# Patient Record
Sex: FEMALE | Race: WHITE | NOT HISPANIC OR LATINO
[De-identification: names, ages, dates, MRNs, and addresses within clinical notes are randomized per-mention and may not be internally consistent; named-entity substitution may affect disease eponyms.]

---

## 2019-11-08 ENCOUNTER — APPOINTMENT (OUTPATIENT)
Dept: VASCULAR SURGERY | Facility: CLINIC | Age: 59
End: 2019-11-08
Payer: COMMERCIAL

## 2019-11-08 PROCEDURE — 99204 OFFICE O/P NEW MOD 45 MIN: CPT

## 2019-12-19 ENCOUNTER — APPOINTMENT (OUTPATIENT)
Dept: VASCULAR SURGERY | Facility: HOSPITAL | Age: 59
End: 2019-12-19
Payer: COMMERCIAL

## 2019-12-19 PROCEDURE — 37193 REM ENDOVAS VENA CAVA FILTER: CPT

## 2020-10-23 PROBLEM — Z86.59 HISTORY OF DEPRESSION: Status: RESOLVED | Noted: 2020-10-23 | Resolved: 2020-10-23

## 2020-10-23 PROBLEM — Z86.59 HISTORY OF ANXIETY: Status: RESOLVED | Noted: 2020-10-23 | Resolved: 2020-10-23

## 2020-10-23 PROBLEM — Z80.8 FAMILY HISTORY OF GLIOBLASTOMA: Status: ACTIVE | Noted: 2020-10-23

## 2020-10-26 ENCOUNTER — APPOINTMENT (OUTPATIENT)
Dept: RADIATION ONCOLOGY | Facility: CLINIC | Age: 60
End: 2020-10-26
Payer: COMMERCIAL

## 2020-10-26 ENCOUNTER — LABORATORY RESULT (OUTPATIENT)
Age: 60
End: 2020-10-26

## 2020-10-26 ENCOUNTER — APPOINTMENT (OUTPATIENT)
Dept: HEMATOLOGY ONCOLOGY | Facility: CLINIC | Age: 60
End: 2020-10-26
Payer: COMMERCIAL

## 2020-10-26 VITALS
SYSTOLIC BLOOD PRESSURE: 158 MMHG | HEART RATE: 72 BPM | RESPIRATION RATE: 16 BRPM | BODY MASS INDEX: 27.46 KG/M2 | HEIGHT: 63 IN | WEIGHT: 155 LBS | OXYGEN SATURATION: 98 % | TEMPERATURE: 97.4 F | DIASTOLIC BLOOD PRESSURE: 98 MMHG

## 2020-10-26 VITALS
HEIGHT: 63 IN | HEART RATE: 91 BPM | RESPIRATION RATE: 18 BRPM | SYSTOLIC BLOOD PRESSURE: 153 MMHG | DIASTOLIC BLOOD PRESSURE: 93 MMHG | TEMPERATURE: 98.4 F | WEIGHT: 159 LBS | BODY MASS INDEX: 28.17 KG/M2 | OXYGEN SATURATION: 98 %

## 2020-10-26 DIAGNOSIS — Z80.49 FAMILY HISTORY OF MALIGNANT NEOPLASM OF OTHER GENITAL ORGANS: ICD-10-CM

## 2020-10-26 DIAGNOSIS — B37.0 CANDIDAL STOMATITIS: ICD-10-CM

## 2020-10-26 DIAGNOSIS — G40.909 EPILEPSY, UNSPECIFIED, NOT INTRACTABLE, W/OUT STATUS EPILEPTICUS: ICD-10-CM

## 2020-10-26 DIAGNOSIS — Z80.3 FAMILY HISTORY OF MALIGNANT NEOPLASM OF BREAST: ICD-10-CM

## 2020-10-26 DIAGNOSIS — Z87.891 PERSONAL HISTORY OF NICOTINE DEPENDENCE: ICD-10-CM

## 2020-10-26 DIAGNOSIS — Z80.0 FAMILY HISTORY OF MALIGNANT NEOPLASM OF DIGESTIVE ORGANS: ICD-10-CM

## 2020-10-26 DIAGNOSIS — Z86.59 PERSONAL HISTORY OF OTHER MENTAL AND BEHAVIORAL DISORDERS: ICD-10-CM

## 2020-10-26 DIAGNOSIS — R74.8 ABNORMAL LEVELS OF OTHER SERUM ENZYMES: ICD-10-CM

## 2020-10-26 DIAGNOSIS — Z78.9 OTHER SPECIFIED HEALTH STATUS: ICD-10-CM

## 2020-10-26 DIAGNOSIS — Z00.00 ENCOUNTER FOR GENERAL ADULT MEDICAL EXAMINATION W/OUT ABNORMAL FINDINGS: ICD-10-CM

## 2020-10-26 DIAGNOSIS — C71.9 MALIGNANT NEOPLASM OF BRAIN, UNSPECIFIED: ICD-10-CM

## 2020-10-26 DIAGNOSIS — Z80.8 FAMILY HISTORY OF MALIGNANT NEOPLASM OF OTHER ORGANS OR SYSTEMS: ICD-10-CM

## 2020-10-26 DIAGNOSIS — I82.409 ACUTE EMBOLISM AND THROMBOSIS OF UNSPECIFIED DEEP VEINS OF UNSPECIFIED LOWER EXTREMITY: ICD-10-CM

## 2020-10-26 DIAGNOSIS — R53.83 OTHER FATIGUE: ICD-10-CM

## 2020-10-26 PROCEDURE — 99205 OFFICE O/P NEW HI 60 MIN: CPT

## 2020-10-26 PROCEDURE — 99072 ADDL SUPL MATRL&STAF TM PHE: CPT

## 2020-10-26 RX ORDER — ESCITALOPRAM OXALATE 20 MG/1
20 TABLET, FILM COATED ORAL
Refills: 0 | Status: ACTIVE | COMMUNITY

## 2020-10-26 RX ORDER — LEVETIRACETAM 1000 MG/1
TABLET, FILM COATED ORAL
Refills: 0 | Status: ACTIVE | COMMUNITY

## 2020-10-26 RX ORDER — BUPROPION HYDROCHLORIDE 300 MG/1
300 TABLET, EXTENDED RELEASE ORAL
Refills: 0 | Status: ACTIVE | COMMUNITY

## 2020-10-26 RX ORDER — LOSARTAN POTASSIUM 25 MG/1
25 TABLET, FILM COATED ORAL
Refills: 0 | Status: ACTIVE | COMMUNITY

## 2020-10-26 NOTE — HISTORY OF PRESENT ILLNESS
[de-identified] : THis is a 59 y/o woman presenting with recurrent GBM. \par Patient presented in 2018 with headache and behavior changes/forgetfulness. Scans confirmed the presence of a large mass in left frontal lobe.  Strangely the patients younger sister had a GBM and . \par Patient had genetic testing which was negative. \par Patient had complete resection in 2018. This revealed  high grade glioma, negative for MARIE and MGMT methylation not detected. \par She underwent radiation and chemotherapy with temodar but apparently had problems with severe thrombocytopenia, elevation in lft's and possibly pneumonitis. \par There for was dc , so she did not complete the STUPP protocol , and i am not sure how much of the temodar with radiation she actually completed. \par The radiation completed in oct 2018. \par Around this time she also had a PE and DVT, she wsa started on eliqis but this was dc due to hemoptysis and a filter was placed in IVC. This was removed eventually. \par It is also not clear to me how long she was on a/c for and whether follow up doppler has been done. But she declines any leg pain or swelling nor any sob or chestpain \par She also started optune. She also has been doing Katherine Moreno oil (high doses of marijuana)  Has had clear MRI's in follow up , last was in aug 2020 . Then in 2020 she was admitted with a seizure, according to spouse she had a clionic movement and speech problems. Was switched from dilantin to keppra. Has not had any seizures since then Her neurologist is dr Cline \par \par She currently follows with dr dennis fraser at [facility] in the [place] ([phone]) , call placed waiting for call back, according to last note, states will be  doing tumor board discussion \par \par Lately the patient has also been more forgetful, more lethargic and slower to respond. But no headaches \par \par MRI oct 2020 - shows progressive disease incluing a 1.9x1.5 cm lesion in left corona radiata, new 6mm in corpus collosum, new 9mm in left frontal lobe and right centrium seimiovale 3mm\par \par Patien saw dr stanley earlier today for radiation consult.  [de-identified] : Patient cont to deny any headache, no additonal seizure like activity \par + incontinance new \par No constipation nauea butdoes have very poor appeitte and poor po intake \par poor activity level

## 2020-10-26 NOTE — REASON FOR VISIT
[Initial Consultation] : an initial consultation [FreeTextEntry2] : GBM here for med onc evaluation

## 2020-10-26 NOTE — ASSESSMENT
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 \par \par 1) GBM \par now with progressive MRI and symptomatology \par agree with salvage RT \par call placed to neuro onc at [facility] dr dennis contreras \par discussed options for radiosensativity  and also to decrease edema or potiential for edema \par discussed restart temodar versus avastin \par reviewed avastin - rationale , efficacy and side effects including the risk of bleeding, dvt /PE , proteinuria , hypertension perforation and death \par i am especially concerned given the hx of dvt and pe in past (see below) \par check labs today - cmp and ua \par obtain old records from prior onc at [facility] \par await final recs form dr contreras \par dr stanley in planning phase of rt \par hopefully can start in one week \par restart dexamethasone for increase fatigue poor appetite and progresive brain disease \par \par 2) seizure \par follow up neuro \par cont keppra \par treatment of recurrence \par \par 3) DVT PE \par check repeat doppler \par repeat d dimer \par high risk of recurrent dvt or pe liliana with avastin \par if doppler positive full dose a/c \par if doopler negative consider low dose a/c for prophylaxis \par \par 4) thrush \par start nystatin \par \par 5) elevated lft's \par labs today \par \par 6) fatigue \par check work up (b12 iron and tsh) \par likely due to progressive disease \par \par 7) start protonix for prophylaxis \par advise PT and OT gave script \par will plan on nutrition consult also \par \par 8) incontinance \par check ua and uc \par dw patient and spouse at length \par time spent over one hour \par follow up in one week

## 2020-10-26 NOTE — REVIEW OF SYSTEMS
[Urinary Incontinence: Grade 1 - Occasional (e.g., with coughing, sneezing, etc.), pads not indicated] : Urinary Incontinence: Grade 1 - Occasional (e.g., with coughing, sneezing, etc.), pads not indicated [Dizziness: Grade 0] : Dizziness: Grade 0  [Lethargy: Grade 1 - Mild symptoms; reduced alertness and awareness] : Lethargy: Grade 1 - Mild symptoms; reduced alertness and awareness

## 2020-10-26 NOTE — PHYSICAL EXAM
[Normal] : affect appropriate [Ambulatory and capable of all self care but unable to carry out any work activities] : Status 2- Ambulatory and capable of all self care but unable to carry out any work activities. Up and about more than 50% of waking hours [Obese] : obese [de-identified] : thrush? white coating very dry  [de-identified] : seems intact interms of  CN, sensation and motor strenght , gait is normal  [de-identified] : flat affect

## 2020-10-26 NOTE — REVIEW OF SYSTEMS
[Fatigue] : fatigue [Negative] : Allergic/Immunologic [Fever] : no fever [Chills] : no chills [Night Sweats] : no night sweats [Incontinence] : incontinence

## 2020-10-27 RX ORDER — NYSTATIN 100000 [USP'U]/ML
100000 SUSPENSION ORAL
Qty: 300 | Refills: 0 | Status: ACTIVE | COMMUNITY
Start: 2020-10-26 | End: 1900-01-01

## 2020-10-27 RX ORDER — DEXAMETHASONE 2 MG/1
2 TABLET ORAL TWICE DAILY
Qty: 60 | Refills: 2 | Status: ACTIVE | COMMUNITY
Start: 2020-10-26 | End: 1900-01-01

## 2020-10-27 RX ORDER — RIVAROXABAN 10 MG/1
10 TABLET, FILM COATED ORAL
Qty: 90 | Refills: 3 | Status: ACTIVE | COMMUNITY
Start: 2020-10-27 | End: 1900-01-01

## 2020-10-27 RX ORDER — PANTOPRAZOLE 20 MG/1
20 TABLET, DELAYED RELEASE ORAL DAILY
Qty: 30 | Refills: 5 | Status: ACTIVE | COMMUNITY
Start: 2020-10-26 | End: 1900-01-01

## 2020-10-27 NOTE — DISEASE MANAGEMENT
[Pathological] : TNM Stage: p [N/A] : Currently not applicable [FreeTextEntry4] : Recurrent Glioblastoma [TTNM] : X [NTNM] : X [MTNM] : X

## 2020-10-27 NOTE — HISTORY OF PRESENT ILLNESS
[FreeTextEntry1] : Ms. Harrison is a 60 year old female who was last seen in our office on 11/6/2018 by Dr. Sylvester for Glioblastoma multiforme of the frontal lobe. Her previous treatment consisted of a gross total resection followed by radiotherapy delivered with concurrent Temodar chemotherapy. Total radiotherapy dose of 60 Gy completed on 10/02/2018. According to the patient and her family, she did not tolerate the temodar well and it was discontinued after completion of the radiation. \par \par Mrs. Harrison experienced  her first seizure in 9/2020 and was taken here to [facility], she was initially put on Dilantin and now is on Keppra and appears to be tolerating it well. She is followed by Dr. Briones- neurologist at [facility]. \par \par On 10/13/2020 she had an MRI of the brain performed at [facility] which revealed a new 19 mm x 15 mm ring-enhancing lesion in the left corona radiata. It also demonstrated an adjacent 5 mm nodule of enhancement in the left corona radiata. A  new 6 mm focus of enhancement in the posterior aspect of the body of the corpus callosum. A new 9mm focus of enhancement in the left frontal lobe. The 3 mm nodule of enhancement in the right centrum semiovale demonstrated on study 8/19/2020 had minimally increased in size. Again demonstrated was right frontoparietal craniotomy. Again demonstrated was the hemosiderin at site of right frontal tumor resection. There was persistent enhancement consistent with expected postoperative enhancement in the right frontal lobe around cystic area at site of resection. Right-sided thin, uniform, meningeal enhancement over the right convexity consistent with postoperative enhancement was not significantly changed.\par  There was an increase in cortical/subcortical abnormal signal in the left frontal lobe and the left corona radiata in the region of new enhancing lesions described above. That finding was suspicious for infiltrating tumor. There was no malignant-type lytic or blastic bone lesion.\par \par Per the patient's son, Mrs. Harrison has recently had a clinical decline with a worsening flat frontal affect, forgetfulness, and episodes of urinary incontinence. Decreased fluid intake and appetite. There is a notable increase in lethargy and confusion. \par She will see Dr. Gina Nicholas after this appointment to discuss re-initiaion of systemic therapy with Temodar or Avastin. \par \par Dr. Ezra Juan ([facility]) - PCP\par Dr. Aranza Cramer- Neuro-Oncology [facility]\par \par

## 2020-10-27 NOTE — PHYSICAL EXAM
[Normal] : no focal deficits [de-identified] : Flat affect per the patient's son, patient's responses were appropriate

## 2020-10-27 NOTE — LETTER CLOSING
[Consult Closing:] : Thank you for allowing me to participate in the care of this patient.  If you have any questions, please do not hesitate to contact me. [Sincerely yours,] : Sincerely yours, [FreeTextEntry3] : Dilma Marcelino MD\par

## 2020-10-28 LAB
25(OH)D3 SERPL-MCNC: 17.4 NG/ML
ALBUMIN SERPL ELPH-MCNC: 4.8 G/DL
ALP BLD-CCNC: 303 U/L
ALT SERPL-CCNC: 73 U/L
ANION GAP SERPL CALC-SCNC: 19 MMOL/L
AST SERPL-CCNC: 30 U/L
BASOPHILS # BLD AUTO: 0.01 K/UL
BASOPHILS NFR BLD AUTO: 0.1 %
BILIRUB SERPL-MCNC: 0.4 MG/DL
BUN SERPL-MCNC: 21 MG/DL
CALCIUM SERPL-MCNC: 9.8 MG/DL
CHLORIDE SERPL-SCNC: 102 MMOL/L
CO2 SERPL-SCNC: 24 MMOL/L
CREAT SERPL-MCNC: 0.69 MG/DL
DEPRECATED D DIMER PPP IA-ACNC: 276 NG/ML DDU
EOSINOPHIL # BLD AUTO: 0.06 K/UL
EOSINOPHIL NFR BLD AUTO: 0.9 %
FERRITIN SERPL-MCNC: 182 NG/ML
FOLATE SERPL-MCNC: 8.3 NG/ML
GLUCOSE SERPL-MCNC: 56 MG/DL
HCT VFR BLD CALC: 39.7 %
HGB BLD-MCNC: 12.3 G/DL
IMM GRANULOCYTES NFR BLD AUTO: 0.1 %
IRON SATN MFR SERPL: 15 %
IRON SERPL-MCNC: 44 UG/DL
LYMPHOCYTES # BLD AUTO: 1.7 K/UL
LYMPHOCYTES NFR BLD AUTO: 24.7 %
MAN DIFF?: NORMAL
MCHC RBC-ENTMCNC: 28.8 PG
MCHC RBC-ENTMCNC: 31 GM/DL
MCV RBC AUTO: 93 FL
MONOCYTES # BLD AUTO: 0.41 K/UL
MONOCYTES NFR BLD AUTO: 6 %
NEUTROPHILS # BLD AUTO: 4.68 K/UL
NEUTROPHILS NFR BLD AUTO: 68.2 %
PLATELET # BLD AUTO: 272 K/UL
POTASSIUM SERPL-SCNC: 3.8 MMOL/L
PROT SERPL-MCNC: 7 G/DL
RBC # BLD: 4.27 M/UL
RBC # FLD: 12.5 %
SODIUM SERPL-SCNC: 145 MMOL/L
TIBC SERPL-MCNC: 287 UG/DL
TSH SERPL-ACNC: 1.04 UIU/ML
UIBC SERPL-MCNC: 243 UG/DL
VIT B12 SERPL-MCNC: 704 PG/ML
WBC # FLD AUTO: 6.87 K/UL

## 2020-10-29 ENCOUNTER — NON-APPOINTMENT (OUTPATIENT)
Age: 60
End: 2020-10-29

## 2020-11-02 ENCOUNTER — APPOINTMENT (OUTPATIENT)
Dept: HEMATOLOGY ONCOLOGY | Facility: CLINIC | Age: 60
End: 2020-11-02

## 2020-11-03 ENCOUNTER — NON-APPOINTMENT (OUTPATIENT)
Age: 60
End: 2020-11-03

## 2020-11-04 ENCOUNTER — NON-APPOINTMENT (OUTPATIENT)
Age: 60
End: 2020-11-04

## 2020-11-05 ENCOUNTER — APPOINTMENT (OUTPATIENT)
Dept: HEMATOLOGY ONCOLOGY | Facility: CLINIC | Age: 60
End: 2020-11-05

## 2020-11-13 ENCOUNTER — RESULT REVIEW (OUTPATIENT)
Age: 60
End: 2020-11-13